# Patient Record
Sex: MALE | Race: WHITE | NOT HISPANIC OR LATINO | URBAN - METROPOLITAN AREA
[De-identification: names, ages, dates, MRNs, and addresses within clinical notes are randomized per-mention and may not be internally consistent; named-entity substitution may affect disease eponyms.]

---

## 2020-03-16 ENCOUNTER — OFFICE (OUTPATIENT)
Dept: URBAN - METROPOLITAN AREA CLINIC 11 | Facility: CLINIC | Age: 60
End: 2020-03-16
Payer: COMMERCIAL

## 2020-03-16 VITALS
HEIGHT: 74 IN | DIASTOLIC BLOOD PRESSURE: 86 MMHG | WEIGHT: 282 LBS | SYSTOLIC BLOOD PRESSURE: 140 MMHG | HEART RATE: 81 BPM

## 2020-03-16 DIAGNOSIS — Z79.01 LONG TERM (CURRENT) USE OF ANTICOAGULANTS: ICD-10-CM

## 2020-03-16 DIAGNOSIS — Z12.11 ENCOUNTER FOR SCREENING FOR MALIGNANT NEOPLASM OF COLON: ICD-10-CM

## 2020-03-16 PROCEDURE — S0285 CNSLT BEFORE SCREEN COLONOSC: HCPCS | Performed by: INTERNAL MEDICINE

## 2020-03-16 RX ORDER — POLYETHYLENE GLYCOL 3350, SODIUM SULFATE, SODIUM CHLORIDE, POTASSIUM CHLORIDE, ASCORBIC ACID, SODIUM ASCORBATE 140-9-5.2G
KIT ORAL
Qty: 1 | Refills: 0 | Status: ACTIVE
Start: 2020-03-16

## 2020-03-16 NOTE — SERVICENOTES
We discussed his anticoagulation use, screening options, and risks.  He will  need clearance for his anticoagulation and agrees to proceed with the the screening colonoscopy.

## 2020-03-16 NOTE — SERVICEHPINOTES
He presents for evalation prior to colonsocopy with a hx of anticoagulation.  He had his last colonoscopy in 2007.  His paternal grandparents had colon cancer but not his parents.  His father's sister had colon cancer as well. They were possibly in their 70s.  He is on coumadin for a clotting disorder but he did not recall the name of the process.  He is followed by Dr. Butts.  He has not had changes in his bowel patterns, He has not had blood in his stools.  He has not had issues with recurrent diarrhea or cnstipatoin. He has HIV/AIDS and is on Tremic (a combination medication).  He thought his cd4 count was 168.  He is seen by ID.